# Patient Record
Sex: FEMALE
[De-identification: names, ages, dates, MRNs, and addresses within clinical notes are randomized per-mention and may not be internally consistent; named-entity substitution may affect disease eponyms.]

---

## 2017-08-09 ENCOUNTER — RESULT REVIEW (OUTPATIENT)
Age: 47
End: 2017-08-09

## 2023-02-14 PROBLEM — Z00.00 ENCOUNTER FOR PREVENTIVE HEALTH EXAMINATION: Status: ACTIVE | Noted: 2023-02-14

## 2023-02-15 ENCOUNTER — APPOINTMENT (OUTPATIENT)
Dept: OBGYN | Facility: CLINIC | Age: 53
End: 2023-02-15
Payer: COMMERCIAL

## 2023-02-15 VITALS
WEIGHT: 190 LBS | DIASTOLIC BLOOD PRESSURE: 70 MMHG | RESPIRATION RATE: 16 BRPM | HEART RATE: 71 BPM | SYSTOLIC BLOOD PRESSURE: 128 MMHG | HEIGHT: 66 IN | BODY MASS INDEX: 30.53 KG/M2 | OXYGEN SATURATION: 97 %

## 2023-02-15 DIAGNOSIS — Z13.820 ENCOUNTER FOR SCREENING FOR OSTEOPOROSIS: ICD-10-CM

## 2023-02-15 DIAGNOSIS — Z80.3 FAMILY HISTORY OF MALIGNANT NEOPLASM OF BREAST: ICD-10-CM

## 2023-02-15 DIAGNOSIS — Z78.9 OTHER SPECIFIED HEALTH STATUS: ICD-10-CM

## 2023-02-15 PROCEDURE — 82270 OCCULT BLOOD FECES: CPT

## 2023-02-15 PROCEDURE — 99386 PREV VISIT NEW AGE 40-64: CPT

## 2023-02-15 NOTE — HISTORY OF PRESENT ILLNESS
[postmenopausal] : postmenopausal [Y] : Positive pregnancy history [Yes] : pregnancy [Currently Active] : currently active [Men] : men [No] : No [Mammogramdate] : 2021 [BreastSonogramDate] : 2021 [PapSmeardate] : 2021 [ColonoscopyDate] : 2019 [TextBox_78] : no hx [LMPDate] : 2008 [PGHxTotal] : 3 [[facility] [PGHxAbortions] : 1 [TextBox_6] : 2008 [TextBox_9] : 13 [FreeTextEntry1] : 2008

## 2023-02-15 NOTE — PLAN
[FreeTextEntry1] : I have spent 40 minutes of time on this encounter.  Greater than 50% of the face-to-face encounter time was spent on counseling and/or coordination of care for examination findings, differential, testing, management and planning. 10 minutes were allotted to discussing the depression screening. Yearly breast cancer screening with no current clinical or radiographic concerns.  The patient was reminded regarding future well breast and general healthcare, breast cancer risk reduction, the importance of self-examination and the need for follow up.   She was again reminded of the need to take Vit D3 2500  IU daily or to keep a Vit D level above 30, and Tumeric 1000mg daily with Black Pepper.  Plan continued yearly imaging and breast follow up, sooner as needed.  I counseled the patient on current recommendations to reduce breast cancer risk including but not inclusive to regular exercise 20-30 minutes 3-4 times a week, low fat diet, limiting alcohol consumption, maintenance of ideal body weight, yearly imaging and self breast awareness.  Questions answered.  I encouraged in light of Covid 19, social distancing, frequent hand washing and precautions to stay healthy.\par  1)  Self breast exam instructions, calcium supplementation discussed with the patient.\par 2)  Mammography, lipid profile assessment, TSH screening, fasting glucose testing, colonoscopy screening were discussed with the patient.  Vitamin D supplementation\par 3)  Maintain healthy weight.\par 4)  Regular health maintenance with PCP.\par 5)  Remain tobacco free.\par 6)  Limit alcohol intake to less than 5 drinks per week.\par 7)  Osteoporosis screening.\par 8)  Annual cholesterol screening.\par 9)  Annual influenza vaccine.The importance of routine physical activity was reviewed and a goal of 150 minutes of moderately vigorous exercise per week was endorsed. \par \par Pt had no palpable masses on her breast bilaterally. Pt was informed to receive breast imaging to further evaluate breast pain.

## 2023-02-15 NOTE — PHYSICAL EXAM
[Chaperone Present] : A chaperone was present in the examining room during all aspects of the physical examination [Appropriately responsive] : appropriately responsive [Alert] : alert [No Acute Distress] : no acute distress [No Lymphadenopathy] : no lymphadenopathy [Regular Rate Rhythm] : regular rate rhythm [No Murmurs] : no murmurs [Clear to Auscultation B/L] : clear to auscultation bilaterally [Soft] : soft [Non-tender] : non-tender [Non-distended] : non-distended [No HSM] : No HSM [No Lesions] : no lesions [No Mass] : no mass [Oriented x3] : oriented x3 [Examination Of The Breasts] : a normal appearance [No Masses] : no breast masses were palpable [Labia Majora] : normal [Labia Minora] : normal [Normal] : normal [Uterine Adnexae] : normal [FreeTextEntry1] : The documentation for this encounter was entered by Leonor Zarate acting as a scribe for Dr. Cruz.  [Occult Blood Positive] : was negative for occult blood analysis

## 2023-02-21 LAB
CYTOLOGY CVX/VAG DOC THIN PREP: ABNORMAL
HPV 16 E6+E7 MRNA CVX QL NAA+PROBE: NOT DETECTED
HPV18+45 E6+E7 MRNA CVX QL NAA+PROBE: NOT DETECTED

## 2024-03-28 DIAGNOSIS — Z11.3 ENCOUNTER FOR SCREENING FOR INFECTIONS WITH A PREDOMINANTLY SEXUAL MODE OF TRANSMISSION: ICD-10-CM

## 2024-03-28 DIAGNOSIS — Z12.39 ENCOUNTER FOR OTHER SCREENING FOR MALIGNANT NEOPLASM OF BREAST: ICD-10-CM

## 2024-04-02 ENCOUNTER — APPOINTMENT (OUTPATIENT)
Dept: OBGYN | Facility: CLINIC | Age: 54
End: 2024-04-02
Payer: COMMERCIAL

## 2024-04-02 ENCOUNTER — LABORATORY RESULT (OUTPATIENT)
Age: 54
End: 2024-04-02

## 2024-04-02 VITALS
RESPIRATION RATE: 16 BRPM | SYSTOLIC BLOOD PRESSURE: 120 MMHG | WEIGHT: 200 LBS | DIASTOLIC BLOOD PRESSURE: 70 MMHG | HEART RATE: 80 BPM | BODY MASS INDEX: 32.14 KG/M2 | OXYGEN SATURATION: 95 % | HEIGHT: 66 IN

## 2024-04-02 DIAGNOSIS — Z12.39 ENCOUNTER FOR OTHER SCREENING FOR MALIGNANT NEOPLASM OF BREAST: ICD-10-CM

## 2024-04-02 DIAGNOSIS — Z12.4 ENCOUNTER FOR SCREENING FOR MALIGNANT NEOPLASM OF CERVIX: ICD-10-CM

## 2024-04-02 DIAGNOSIS — Z13.31 ENCOUNTER FOR SCREENING FOR DEPRESSION: ICD-10-CM

## 2024-04-02 DIAGNOSIS — Z01.419 ENCOUNTER FOR GYNECOLOGICAL EXAMINATION (GENERAL) (ROUTINE) W/OUT ABNORMAL FINDINGS: ICD-10-CM

## 2024-04-02 DIAGNOSIS — Z85.3 PERSONAL HISTORY OF MALIGNANT NEOPLASM OF BREAST: ICD-10-CM

## 2024-04-02 DIAGNOSIS — Z12.11 ENCOUNTER FOR SCREENING FOR MALIGNANT NEOPLASM OF COLON: ICD-10-CM

## 2024-04-02 PROCEDURE — 82270 OCCULT BLOOD FECES: CPT

## 2024-04-02 PROCEDURE — 99459 PELVIC EXAMINATION: CPT

## 2024-04-02 PROCEDURE — 99396 PREV VISIT EST AGE 40-64: CPT

## 2024-04-02 NOTE — PLAN
[FreeTextEntry1] : 1)  Self breast exam instructions, calcium supplementation discussed with the patient. 2)  Mammography, lipid profile assessment, TSH screening, fasting glucose testing, colonoscopy screening were discussed with the patient.  Vitamin D supplementation 3)  Maintain healthy weight. 4)  Regular health maintenance with PCP. 5)  Remain tobacco free. 6)  Limit alcohol intake to less than 5 drinks per week. 7)  Osteoporosis screening. 8)  Annual cholesterol screening. 9)  Annual influenza vaccine. The importance of routine physical activity was reviewed and a goal of 150 minutes of moderately vigorous exercise per week was endorsed.   Yearly breast cancer screening with no current clinical or radiographic concerns. The patient was reminded regarding future well breast and general healthcare, breast cancer risk reduction, the importance of self-examination and the need for follow up. She was again reminded of the need to take Vit D3 2500 IU daily or to keep a Vit D level above 30, and Tumeric 1000 mg daily with Black Pepper. Plan continued yearly imaging and breast follow up, sooner as needed. I counseled the patient on current recommendations to reduce breast cancer risk including but not inclusive to regular exercise 20-30 minutes 3-4 times a week, low fat diet, limiting alcohol consumption, maintenance of ideal body weight, yearly imaging and self-breast awareness. Questions answered. I encouraged in light of COVID-19, social distancing, frequent hand washing and precautions to stay healthy.   I have spent 40 minutes of time on this encounter. Greater than 50% of the face-to-face encounter time was spent on counseling and/or coordination of care for examination findings, differential, testing, management and planning.

## 2024-04-02 NOTE — PHYSICAL EXAM
[Chaperone Present] : A chaperone was present in the examining room during all aspects of the physical examination [Alert] : alert [Appropriately responsive] : appropriately responsive [No Lymphadenopathy] : no lymphadenopathy [No Acute Distress] : no acute distress [Soft] : soft [Non-tender] : non-tender [Non-distended] : non-distended [No Lesions] : no lesions [No HSM] : No HSM [No Mass] : no mass [Oriented x3] : oriented x3 [Examination Of The Breasts] : a normal appearance [No Masses] : no breast masses were palpable [Labia Majora] : normal [Labia Minora] : normal [Uterine Adnexae] : normal [Normal rectal exam] : was normal [Normal Brown Stool] : was normal and brown [Normal] : was normal [None] : there was no rectal abscess [FreeTextEntry1] : This note was written by Bella Hernandez on 04/02/2024, acting as a scribe for Dr. Gilberto Cruz MD. All medic record entries were at my, Dr. Gilberto Cruz MD, direction and personally dictated by me in 04/02/2024. I have personally reviewed the chart and agree that the record accurately reflects my personal performance of the history, physical exam, assessment, and plan.  [FreeTextEntry6] : L breast vertical incision well healed with radiation changes [Rt > Lt] : the right breast was larger than the left [Occult Blood Positive] : was negative for occult blood analysis [Internal Hemorrhoid] : no internal hemorrhoids were present [External Hemorrhoid] : no external hemorrhoids were present [Skin Tags] : no residual hemorrhoidal skin tags [FreeTextEntry8] : Normal exam

## 2024-04-02 NOTE — COUNSELING
[Nutrition/ Exercise/ Weight Management] : nutrition, exercise, weight management [Vitamins/Supplements] : vitamins/supplements [Body Image] : body image [Breast Self Exam] : breast self exam [Sunscreen] : sunscreen [Bladder Hygiene] : bladder hygiene

## 2024-04-02 NOTE — HISTORY OF PRESENT ILLNESS
[postmenopausal] : postmenopausal [Y] : Positive pregnancy history [Yes] : pregnancy [Post-Menopause, No Sxs] : post-menopausal, currently without symptoms [Currently Active] : currently active [Men] : men [No] : No [TextBox_4] : The patient presents today for a routine GYN exam. She offers no complaints. We reviewed together in detail her past medical and surgical histories, allergies and medication usage, social and family history. All questions were answered in easy-to-understand language. [Mammogramdate] : 3/24/2023 [BreastSonogramDate] : 3/24/2023 [PapSmeardate] : 2/15/2023 [BoneDensityDate] : 3/24/2023 [ColonoscopyDate] : 2019 [TextBox_78] : no hx [LMPDate] : 2008 [PGHxTotal] : 3 [[facility] [PGHxAbortions] : 1 [TextBox_6] : 2008 [TextBox_9] : 13

## 2024-04-24 ENCOUNTER — NON-APPOINTMENT (OUTPATIENT)
Age: 54
End: 2024-04-24